# Patient Record
Sex: MALE | Race: WHITE
[De-identification: names, ages, dates, MRNs, and addresses within clinical notes are randomized per-mention and may not be internally consistent; named-entity substitution may affect disease eponyms.]

---
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
## 2024-10-15 ENCOUNTER — HOSPITAL ENCOUNTER (EMERGENCY)
Dept: HOSPITAL 97 - ER | Age: 24
Discharge: HOME | End: 2024-10-15
Payer: COMMERCIAL

## 2024-10-15 VITALS — DIASTOLIC BLOOD PRESSURE: 76 MMHG | SYSTOLIC BLOOD PRESSURE: 123 MMHG

## 2024-10-15 VITALS — TEMPERATURE: 98.1 F | OXYGEN SATURATION: 99 %
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
## 2024-10-15 DIAGNOSIS — S09.90XA: Primary | ICD-10-CM

## 2024-10-15 DIAGNOSIS — M25.522: ICD-10-CM

## 2024-10-15 PROCEDURE — 70450 CT HEAD/BRAIN W/O DYE: CPT

## 2024-10-15 PROCEDURE — 72125 CT NECK SPINE W/O DYE: CPT

## 2024-10-15 PROCEDURE — 99283 EMERGENCY DEPT VISIT LOW MDM: CPT

## 2024-10-15 NOTE — RAD REPORT
EXAMINATION: XR Elbow Left 3 View



CLINICAL INDICATION: Male, 24 years old. PAIN



TECHNIQUE: 3 view radiographs of the left elbow were obtained. 



COMPARISON: No prior exam.



FINDINGS: No evidence of fracture or dislocation. Normal alignment. No joint effusion. No evidence of
 arthropathy. No suspicious focal bone lesion. Soft tissues are unremarkable.



IMPRESSION:

No acute or significant abnormalities.



Reported By: Felipe William 

Electronically Signed:  10/15/2024 7:54 PM

## 2024-10-15 NOTE — RAD REPORT
EXAM: CT brain without contrast



HISTORY: TRAUMA



COMPARISON: None



TECHNIQUE: Multiple contiguous axial images were obtained and a CT of the brain without contrast. Sag
ittal and coronal reformats were performed.





FINDINGS:No evidence of hydrocephalus, intracranial hemorrhage, or extra-axial fluid collection.

 The brain is normal in morphology.



The calvarium is intact. The visualized paranasal sinuses and mastoid air cells are essentially clear
.



IMPRESSION: 



No evidence of acute intracranial abnormality. 





EXAM: CT of the cervical spine without contrast



HISTORY:  TRAUMA



COMPARISON: None



TECHNIQUE: Multiple contiguous axial images were obtained in a CT of the cervical spine without contr
ast. Sagittal and coronal reformats were performed.



FINDINGS: The vertebral bodies demonstrate normal height and alignment. No evidence of acute fracture
 or subluxation.. No degenerative changes are present.  No prevertebral soft tissue swelling is

seen. 



The posterior facets are well aligned. Normal alignment of the skull base with the cervical spine is 
seen.



The lung apices are unremarkable.   



IMPRESSION: 



No evidence of acute osseous abnormality of the cervical spine.







Reported By: Felipe William 

Electronically Signed:  10/15/2024 7:39 PM

## 2024-10-15 NOTE — EDPHYS
Physician Documentation                                                                           

 [facility]                                                                 

Name: Denis Boudreaux                                                                                 

Age: 24 yrs                                                                                       

Sex: Male                                                                                         

: 2000                                                                                   

MRN: [id number]                                                                                   

Arrival Date: 10/15/2024                                                                          

Time: 17:30                                                                                       

Account#: [id number]                                                                            

Bed 25                                                                                            

Private MD:                                                                                       

ED Physician Roger Hawthorne                                                                        

HPI:                                                                                              

10/15                                                                                             

21:02 This 24 yrs old Male presents to ER via Ambulatory with complaints of Fall Injury, Head kb  

      Injury Without LOC-Adult, Elbow Injury - left.                                              

21:02 Pt is a 24 year old male who presents after slip and fall just pta. States he hit the   kb  

      back of his head and left elbow. Reports pain to elbow, head and soreness to neck.          

      Denies loc.                                                                                 

                                                                                                  

Historical:                                                                                       

- Allergies:                                                                                      

17:54 No Known Allergies;                                                                     ap3 

- PMHx:                                                                                           

17:54 Seizure;                                                                                ap3 

                                                                                                  

- Immunization history:: Client reports receiving the 2nd dose of the Covid vaccine.              

- Infectious Disease History:: Denies.                                                            

- Social history:: Smoking status: Reported history of juuling and/or vaping.                     

                                                                                                  

                                                                                                  

ROS:                                                                                              

21:01 Constitutional: As per HPI                                                              kb  

                                                                                                  

Exam:                                                                                             

21:01 Constitutional:  This is a well developed, well nourished patient who is awake, alert,  kb  

      and in no acute distress. Head/Face:  Normocephalic, atraumatic. ENT:  Moist Mucous         

      membranes Cardiovascular:  Regular rate  Respiratory:  Respirations even and unlabored.     

      No increased work of breathing. Talking in full sentences Abdomen/GI:  Soft,                

      non-tender. No distention Skin:  Warm, dry with normal turgor.  Normal color. Neuro:        

      Awake and alert, GCS 15, oriented to person, place, time, and situation.                    

21:01 Musculoskeletal/extremity: Extremities: grossly normal except: noted in the left elbow:     

      decreased ROM, pain, swelling, tenderness, ROM: limited active range of motion due to       

      pain, Circulation is intact in all extremities. Sensation intact. Weight bearing: able      

      to fully bear weight,                                                                       

                                                                                                  

Vital Signs:                                                                                      

17:53  / 74; Pulse 94; Resp 17; Temp 97.5; Pulse Ox 100% ; Weight 104.33 kg; Height 6   ap3 

      ft. 6 in. ; Pain 5/10;                                                                      

18:11  / 85; Pulse 77; Resp 18; Temp 98.1; Pulse Ox 99% on R/A; Weight 105.69 kg;       ar6 

      Height 6 ft. 6 in. ; Pain 5/10;                                                             

19:07  / 63; Pulse 84; Resp 19; Pulse Ox 99% on R/A;                                    ar6 

19:55  / 82; Pulse 76; Resp 17; Pulse Ox 99% on R/A;                                    ar6 

20:12  / 76; Pulse 72; Resp 18; Pulse Ox 99% on R/A;                                    ar6 

18:11 Body Mass Index 26.93 (105.69 kg, 198.12 cm)                                            ar6 

17:53 Pain Scale: Adult                                                                       ap3 

18:11 Pain Scale: Adult                                                                       ar6 

                                                                                                  

[place] Coma Score:                                                                               

17:56 Eye Response: spontaneous(4). Motor Response: obeys commands(6). Verbal Response:       ap3 

      oriented(5). Total: 15.                                                                     

                                                                                                  

Trauma Score (Adult):                                                                             

17:56 Eye Response: spontaneous(1); Verbal Response: oriented(1); Motor Response: obeys       ap3 

      commands(2); Systolic BP: > 89 mm Hg(4); Respiratory Rate: 10 to 29 per min(4); [place]     

      Score: 15; Trauma Score: 12                                                                 

                                                                                                  

MDM:                                                                                              

17:32 Medical Screening Exam initiated                                                        kb  

21:01 Differential diagnosis: closed head injury, contusion, fracture. Data reviewed: vital   kb  

      signs, nurses notes. Counseling: I had a detailed discussion with the patient and/or        

      guardian regarding the historical points, exam findings, and any diagnostic results         

      supporting the discharge/admit diagnosis, radiology results, the need for outpatient        

      follow up, a family practitioner, to return to the emergency department if symptoms         

      worsen or persist or if there are any questions or concerns that arise at home.             

                                                                                                  

10/15                                                                                             

17:54 Order name: CT Head C Spine; Complete Time: 19:42                                       kb  

10/15                                                                                             

17:54 Order name: Elbow Left 3 View XRAY; Complete Time: 20:05                                kb  

                                                                                                  

Administered Medications:                                                                         

No medications were administered                                                                  

                                                                                                  

                                                                                                  

Disposition Summary:                                                                              

10/15/24 20:10                                                                                    

Discharge Ordered                                                                                 

 Notes:       Location: Home                                                                        
  

      Condition: Stable                                                                       kb  

      Diagnosis                                                                                   

        - Unspecified injury of head, initial encounter                                       kb  

        - Pain in left elbow                                                                  kb  

      Followup:                                                                               kb  

        - With: Emergency Department                                                               

        - When: As needed                                                                          

        - Reason: Worsening of condition                                                           

      Followup:                                                                               kb  

        - With: Private Physician                                                                  

        - When: 2 - 3 days                                                                         

        - Reason: Recheck today's complaints, Continuance of care, Re-evaluation by your           

      physician                                                                                   

      Discharge Instructions:                                                                     

        - Discharge Summary Sheet                                                             kb  

        - Head Injury, Adult, Easy-to-Read                                                    kb  

        - Elbow Contusion, Easy-to-Read                                                       kb  

      Forms:                                                                                      

        - Work release form                                                                   kb  

        - Medication Reconciliation Form                                                      kb  

        - Antibiotic Education                                                                kb  

        - Prescription Opioid Use                                                             kb  

        - Patient Portal Instructions                                                         kb  

        - Leadership Thank You Letter                                                         kb  

Signatures:                                                                                       

Dispatcher [employer]                           EDMS                                                 

Chely Melissa, Krissy Dominguez RN                    RN   ap3                                                  

                                                                                                  

Corrections: (The following items were deleted from the chart)                                    

17:55 17:54 Elbow Left 3 View+RAD.RAD.BRZ ordered. EDMS                                       EDMS

                                                                                                  

**************************************************************************************************

## 2024-10-15 NOTE — ER
Nurse's Notes                                                                                     

 [facility]                                                                 

Name: Denis Boudreaux                                                                                 

Age: 24 yrs                                                                                       

Sex: Male                                                                                         

: 2000                                                                                   

MRN: [id number]                                                                                   

Arrival Date: 10/15/2024                                                                          

Time: 17:30                                                                                       

Account#: [id number]                                                                            

Bed 25                                                                                            

Private MD:                                                                                       

Diagnosis: Unspecified injury of head, initial encounter;Pain in left elbow                       

                                                                                                  

Presentation:                                                                                     

10/15                                                                                             

17:53 Chief complaint: Patient states: he slipped in dog urine at approx 1711 today hitting   ap3 

      the back of his head on the floor and his elbow on the stair. patient reports his pain      

      as a 5/10 on the pain scale. Coronavirus screen: At this time, the client does not          

      indicate any symptoms associated with coronavirus-19. Ebola Screen: No symptoms or          

      risks identified at this time. Initial Sepsis Screen: Does the patient meet any 2           

      criteria? No. Patient's initial sepsis screen is negative. Does the patient have a          

      suspected source of infection? No. Patient's initial sepsis screen is negative. Risk        

      Assessment: Do you want to hurt yourself or someone else? Patient reports no desire to      

      harm self or others. Onset of symptoms was October 15, 2024 at 17:11.                       

17:53 Method Of Arrival: Ambulatory                                                           ap3 

17:53 Acuity: CORINNA 3                                                                           ap3 

                                                                                                  

Triage Assessment:                                                                                

17:55 General: Appears uncomfortable, Behavior is calm, cooperative, appropriate for age.     ap3 

      Pain: Complains of pain in left elbow Pain currently is 5 out of 10 on a pain scale.        

      Pain began suddenly. Neuro: Level of Consciousness is awake, alert, obeys commands,         

      Oriented to person, place, time, situation, Appropriate for age. Cardiovascular:            

      Patient's skin is warm and dry. Respiratory: Airway is patent Respiratory effort is         

      even, unlabored, Respiratory pattern is regular, symmetrical.                               

                                                                                                  

Historical:                                                                                       

- Allergies:                                                                                      

17:54 No Known Allergies;                                                                     ap3 

- PMHx:                                                                                           

17:54 Seizure;                                                                                ap3 

                                                                                                  

- Immunization history:: Client reports receiving the 2nd dose of the Covid vaccine.              

- Infectious Disease History:: Denies.                                                            

- Social history:: Smoking status: Reported history of juuling and/or vaping.                     

                                                                                                  

                                                                                                  

Screenin:55 Abuse screen: Denies threats or abuse. Nutritional screening: No deficits noted.        ap3 

      Tuberculosis screening: No symptoms or risk factors identified.                             

18:11 [facility] ED Fall Risk Assessment (Adult) History of falling in the last 3 months,       ar6 

      including since admission Yes- single mechanical fall (1 pt) Confusion or                   

      Disorientation No (0 pts) Intoxicated or Sedated No (0 pts) Impaired Gait No (0 pts)        

      Mobility Assist Device Used No (0 pt) Altered Elimination No (0 pt) Score/Fall Risk         

      Level 0 - 2 = Low Risk Oriented to surroundings, Maintained a safe environment,             

      Educated pt \T\ family on fall prevention, incl call for assistance when getting out of     

      bed, Hourly rounding (assess needs \T\ fall precautionary measures) done.                   

                                                                                                  

Primary Survey:                                                                                   

17:56 NO uncontrolled hemorrhage observed. A: The client is awake and alert. The airway is    ap3 

      patent. Breathing/Chest: Spontaneous respiratory effort, equal unlabored respirations,      

      breath sounds clear bilaterally, regular pattern, symmetrical chest rise and fall.          

      Circulation: No external hemorrhage present. Regular and strong central pulse, skin         

      warm/dry/normal color. Disability Client is alert. Exposure/Environment: A warming          

      method has been applied: A warm blanket has been provided to the patient.                   

                                                                                                  

Assessment:                                                                                       

18:11 General: Appears in no apparent distress. comfortable, Behavior is calm, cooperative,   ar6 

      appropriate for age. Pain: Complains of pain in scalp Pain currently is 5 out of 10 on      

      a pain scale. Neuro: Level of Consciousness is awake, alert, obeys commands, Oriented       

      to person, place, time, situation. Cardiovascular: Capillary refill < 3 seconds.            

      Respiratory: Airway is patent. GI: Abdomen is round non-distended. : No signs and/or      

      symptoms were reported regarding the genitourinary system. EENT: Oral mucosa is moist.      

      Derm: Skin is intact, is healthy with good turgor, Skin is dry. Musculoskeletal: pt.        

      reports left elbow pain Reports pain in left arm.                                           

                                                                                                  

Vital Signs:                                                                                      

17:53  / 74; Pulse 94; Resp 17; Temp 97.5; Pulse Ox 100% ; Weight 104.33 kg; Height 6   ap3 

      ft. 6 in. ; Pain 5/10;                                                                      

18:11  / 85; Pulse 77; Resp 18; Temp 98.1; Pulse Ox 99% on R/A; Weight 105.69 kg;       ar6 

      Height 6 ft. 6 in. ; Pain 5/10;                                                             

19:07  / 63; Pulse 84; Resp 19; Pulse Ox 99% on R/A;                                    ar6 

19:55  / 82; Pulse 76; Resp 17; Pulse Ox 99% on R/A;                                    ar6 

20:12  / 76; Pulse 72; Resp 18; Pulse Ox 99% on R/A;                                    ar6 

18:11 Body Mass Index 26.93 (105.69 kg, 198.12 cm)                                            ar6 

17:53 Pain Scale: Adult                                                                       ap3 

18:11 Pain Scale: Adult                                                                       ar6 

                                                                                                  

[place] Coma Score:                                                                               

17:56 Eye Response: spontaneous(4). Motor Response: obeys commands(6). Verbal Response:       ap3 

      oriented(5). Total: 15.                                                                     

                                                                                                  

Trauma Score (Adult):                                                                             

17:56 Eye Response: spontaneous(1); Verbal Response: oriented(1); Motor Response: obeys       ap3 

      commands(2); Systolic BP: > 89 mm Hg(4); Respiratory Rate: 10 to 29 per min(4); Chemo     

      Score: 15; Trauma Score: 12                                                                 

                                                                                                  

ED Course:                                                                                        

17:31 Patient arrived in ED.                                                                  ra3 

17:32 [facility]                                                        kb  

17:32 Roger Hawthorne MD is Attending Physician.                                               kb  

17:54 Triage completed.                                                                       ap3 

17:56 Arm band placed on right wrist.                                                         ap3 

17:56 Patient maintains SpO2 saturation greater than 95% on room air.                         ap3 

18:03 Kristina Saez, RN is Primary Nurse.                                                    ar6 

18:11 No apparent distress. Awaiting CT Scan.                                                 ar6 

18:11 Patient has correct armband on for positive identification. Bed in low position. Call   ar6 

      light in reach. Side rails up X 1. Provided Education on: plan of care. Pulse ox on.        

      NIBP on. Door closed. Lights dimmed. Moved to private room. Warm blanket given.             

18:11 No provider procedures requiring assistance completed.                                  ar6 

18:18 Elbow Left 3 View XRAY In Process Unspecified.                                          EDMS

18:39 CT Head C Spine In Process Unspecified.                                                 EDMS

                                                                                                  

Administered Medications:                                                                         

No medications were administered                                                                  

                                                                                                  

                                                                                                  

Medication:                                                                                       

18:11 VIS not applicable for this client.                                                     ar6 

                                                                                                  

Outcome:                                                                                          

20:10 Discharge ordered by MD.                                                                kb  

20:13 Discharged to home ambulatory,                                                          ar6 

20:13 Condition: good                                                                             

20:13 Discharge instructions given to patient, family, Instructed on discharge instructions,      

      follow up and referral plans. Demonstrated understanding of instructions, follow-up         

      care,                                                                                       

20:16 Patient left the ED.                                                                    ar6 

                                                                                                  

Signatures:                                                                                       

Dispatcher [employer]                           EDChely Kramer, LISAC                 Krissy Crain, RN                    RN   [id number]                                                  

Subha Cruz Amber, RN                      RN   ar6                                                  

                                                                                                  

**************************************************************************************************